# Patient Record
Sex: FEMALE | Race: WHITE | HISPANIC OR LATINO | Employment: UNEMPLOYED | URBAN - METROPOLITAN AREA
[De-identification: names, ages, dates, MRNs, and addresses within clinical notes are randomized per-mention and may not be internally consistent; named-entity substitution may affect disease eponyms.]

---

## 2024-01-26 ENCOUNTER — HOSPITAL ENCOUNTER (EMERGENCY)
Facility: HOSPITAL | Age: 49
Discharge: HOME | End: 2024-01-26
Attending: FAMILY MEDICINE

## 2024-01-26 VITALS
HEIGHT: 63 IN | HEART RATE: 76 BPM | OXYGEN SATURATION: 98 % | RESPIRATION RATE: 20 BRPM | BODY MASS INDEX: 30.48 KG/M2 | WEIGHT: 172 LBS | DIASTOLIC BLOOD PRESSURE: 73 MMHG | SYSTOLIC BLOOD PRESSURE: 128 MMHG | TEMPERATURE: 98 F

## 2024-01-26 DIAGNOSIS — N30.00 ACUTE CYSTITIS WITHOUT HEMATURIA: Primary | ICD-10-CM

## 2024-01-26 DIAGNOSIS — N12 PYELONEPHRITIS: ICD-10-CM

## 2024-01-26 LAB
ALBUMIN SERPL BCP-MCNC: 4.2 G/DL (ref 3.5–5.2)
ALP SERPL-CCNC: 98 U/L (ref 38–126)
ALT SERPL W/O P-5'-P-CCNC: 22 U/L (ref 10–44)
ANION GAP SERPL CALC-SCNC: 14 MMOL/L (ref 8–16)
AST SERPL-CCNC: 24 U/L (ref 15–46)
B-HCG UR QL: NEGATIVE
BACTERIA #/AREA URNS AUTO: ABNORMAL /HPF
BASOPHILS # BLD AUTO: 0.02 K/UL (ref 0–0.2)
BASOPHILS NFR BLD: 0.2 % (ref 0–1.9)
BILIRUB SERPL-MCNC: 0.4 MG/DL (ref 0.1–1)
BILIRUB UR QL STRIP: NEGATIVE
CALCIUM SERPL-MCNC: 9.4 MG/DL (ref 8.7–10.5)
CHLORIDE SERPL-SCNC: 101 MMOL/L (ref 95–110)
CLARITY UR REFRACT.AUTO: CLEAR
CO2 SERPL-SCNC: 21 MMOL/L (ref 23–29)
COLOR UR AUTO: YELLOW
CREAT SERPL-MCNC: 0.29 MG/DL (ref 0.5–1.4)
CTP QC/QA: YES
DIFFERENTIAL METHOD BLD: NORMAL
EOSINOPHIL # BLD AUTO: 0.2 K/UL (ref 0–0.5)
EOSINOPHIL NFR BLD: 1.9 % (ref 0–8)
ERYTHROCYTE [DISTWIDTH] IN BLOOD BY AUTOMATED COUNT: 11.7 % (ref 11.5–14.5)
EST. GFR  (NO RACE VARIABLE): >60 ML/MIN/1.73 M^2
GLUCOSE SERPL-MCNC: 264 MG/DL (ref 70–110)
GLUCOSE UR QL STRIP: ABNORMAL
HCT VFR BLD AUTO: 44.5 % (ref 37–48.5)
HGB BLD-MCNC: 14.8 G/DL (ref 12–16)
HGB UR QL STRIP: ABNORMAL
IMM GRANULOCYTES # BLD AUTO: 0.03 K/UL (ref 0–0.04)
IMM GRANULOCYTES NFR BLD AUTO: 0.3 % (ref 0–0.5)
KETONES UR QL STRIP: NEGATIVE
LEUKOCYTE ESTERASE UR QL STRIP: ABNORMAL
LIPASE SERPL-CCNC: 86 U/L (ref 23–300)
LYMPHOCYTES # BLD AUTO: 2.2 K/UL (ref 1–4.8)
LYMPHOCYTES NFR BLD: 25.6 % (ref 18–48)
MCH RBC QN AUTO: 28.8 PG (ref 27–31)
MCHC RBC AUTO-ENTMCNC: 33.3 G/DL (ref 32–36)
MCV RBC AUTO: 87 FL (ref 82–98)
MICROSCOPIC COMMENT: ABNORMAL
MONOCYTES # BLD AUTO: 0.4 K/UL (ref 0.3–1)
MONOCYTES NFR BLD: 4.2 % (ref 4–15)
NEUTROPHILS # BLD AUTO: 5.9 K/UL (ref 1.8–7.7)
NEUTROPHILS NFR BLD: 67.8 % (ref 38–73)
NITRITE UR QL STRIP: NEGATIVE
NRBC BLD-RTO: 0 /100 WBC
PH UR STRIP: 6 [PH] (ref 5–8)
PLATELET # BLD AUTO: 256 K/UL (ref 150–450)
PMV BLD AUTO: 10.1 FL (ref 9.2–12.9)
POTASSIUM SERPL-SCNC: 3.6 MMOL/L (ref 3.5–5.1)
PROT SERPL-MCNC: 7.8 G/DL (ref 6–8.4)
PROT UR QL STRIP: NEGATIVE
RBC # BLD AUTO: 5.13 M/UL (ref 4–5.4)
RBC #/AREA URNS AUTO: 3 /HPF (ref 0–4)
SODIUM SERPL-SCNC: 136 MMOL/L (ref 136–145)
SP GR UR STRIP: 1.01 (ref 1–1.03)
URN SPEC COLLECT METH UR: ABNORMAL
UROBILINOGEN UR STRIP-ACNC: NEGATIVE EU/DL
UUN UR-MCNC: 8 MG/DL (ref 7–17)
WBC # BLD AUTO: 8.76 K/UL (ref 3.9–12.7)
WBC #/AREA URNS AUTO: 60 /HPF (ref 0–5)
WBC CLUMPS UR QL AUTO: ABNORMAL
YEAST UR QL AUTO: ABNORMAL

## 2024-01-26 PROCEDURE — 87186 SC STD MICRODIL/AGAR DIL: CPT | Mod: ER | Performed by: PHYSICIAN ASSISTANT

## 2024-01-26 PROCEDURE — 85025 COMPLETE CBC W/AUTO DIFF WBC: CPT | Mod: ER | Performed by: PHYSICIAN ASSISTANT

## 2024-01-26 PROCEDURE — 96375 TX/PRO/DX INJ NEW DRUG ADDON: CPT | Mod: ER

## 2024-01-26 PROCEDURE — 96365 THER/PROPH/DIAG IV INF INIT: CPT | Mod: ER

## 2024-01-26 PROCEDURE — 87147 CULTURE TYPE IMMUNOLOGIC: CPT | Mod: ER | Performed by: PHYSICIAN ASSISTANT

## 2024-01-26 PROCEDURE — 83690 ASSAY OF LIPASE: CPT | Mod: ER | Performed by: PHYSICIAN ASSISTANT

## 2024-01-26 PROCEDURE — 63600175 PHARM REV CODE 636 W HCPCS: Mod: ER | Performed by: PHYSICIAN ASSISTANT

## 2024-01-26 PROCEDURE — 87088 URINE BACTERIA CULTURE: CPT | Mod: ER | Performed by: PHYSICIAN ASSISTANT

## 2024-01-26 PROCEDURE — 81000 URINALYSIS NONAUTO W/SCOPE: CPT | Mod: ER | Performed by: PHYSICIAN ASSISTANT

## 2024-01-26 PROCEDURE — 96361 HYDRATE IV INFUSION ADD-ON: CPT | Mod: ER

## 2024-01-26 PROCEDURE — 87086 URINE CULTURE/COLONY COUNT: CPT | Mod: ER | Performed by: PHYSICIAN ASSISTANT

## 2024-01-26 PROCEDURE — 99285 EMERGENCY DEPT VISIT HI MDM: CPT | Mod: 25,ER

## 2024-01-26 PROCEDURE — 25000003 PHARM REV CODE 250: Mod: ER | Performed by: PHYSICIAN ASSISTANT

## 2024-01-26 PROCEDURE — 80053 COMPREHEN METABOLIC PANEL: CPT | Mod: ER | Performed by: PHYSICIAN ASSISTANT

## 2024-01-26 PROCEDURE — 81025 URINE PREGNANCY TEST: CPT | Mod: ER | Performed by: PHYSICIAN ASSISTANT

## 2024-01-26 PROCEDURE — 87077 CULTURE AEROBIC IDENTIFY: CPT | Mod: ER | Performed by: PHYSICIAN ASSISTANT

## 2024-01-26 RX ORDER — ONDANSETRON HYDROCHLORIDE 2 MG/ML
4 INJECTION, SOLUTION INTRAVENOUS
Status: COMPLETED | OUTPATIENT
Start: 2024-01-26 | End: 2024-01-26

## 2024-01-26 RX ORDER — ONDANSETRON 4 MG/1
4 TABLET, ORALLY DISINTEGRATING ORAL EVERY 6 HOURS PRN
Qty: 20 TABLET | Refills: 0 | Status: SHIPPED | OUTPATIENT
Start: 2024-01-26

## 2024-01-26 RX ORDER — CEPHALEXIN 500 MG/1
500 CAPSULE ORAL EVERY 8 HOURS
Qty: 21 CAPSULE | Refills: 0 | Status: SHIPPED | OUTPATIENT
Start: 2024-01-26 | End: 2024-02-02

## 2024-01-26 RX ORDER — KETOROLAC TROMETHAMINE 30 MG/ML
15 INJECTION, SOLUTION INTRAMUSCULAR; INTRAVENOUS
Status: COMPLETED | OUTPATIENT
Start: 2024-01-26 | End: 2024-01-26

## 2024-01-26 RX ADMIN — CEFTRIAXONE SODIUM 1 G: 1 INJECTION, POWDER, FOR SOLUTION INTRAMUSCULAR; INTRAVENOUS at 11:01

## 2024-01-26 RX ADMIN — KETOROLAC TROMETHAMINE 15 MG: 30 INJECTION, SOLUTION INTRAMUSCULAR; INTRAVENOUS at 11:01

## 2024-01-26 RX ADMIN — SODIUM CHLORIDE 1000 ML: 9 INJECTION, SOLUTION INTRAVENOUS at 11:01

## 2024-01-26 RX ADMIN — ONDANSETRON 4 MG: 2 INJECTION INTRAMUSCULAR; INTRAVENOUS at 11:01

## 2024-01-26 NOTE — DISCHARGE INSTRUCTIONS

## 2024-01-26 NOTE — ED PROVIDER NOTES
Encounter Date: 1/26/2024       History     Chief Complaint   Patient presents with    Abdominal Pain     Lower abdominal pain, back pain, painful urination, nausea, no vomiting, started over 2 days ago      48-year-old female, PMH dm, presents to ED with concern of 2 day onset dysuria, increase in urine frequency, hematuria, flank pain, back pain, nausea.  Denying fevers, chills, vomiting, bowel complaints, chest pain, cough, shortness of breath.  She does admit she was seen at outside facility 2 days ago but eloped due to long wait.  She has not taken any medications for her symptoms.  Denies history of kidney stones or kidney complications.  No other acute complaints at this time.    The history is provided by the patient. The history is limited by a language barrier. A  was used ([facility]:  [id number]).     Review of patient's allergies indicates:  No Known Allergies  Past Medical History:   Diagnosis Date    Diabetes mellitus      History reviewed. No pertinent surgical history.  History reviewed. No pertinent family history.  Social History     Tobacco Use    Smoking status: Never   Substance Use Topics    Alcohol use: No    Drug use: No     Review of Systems   Constitutional:  Negative for chills and fever.   HENT:  Negative for congestion and sore throat.    Respiratory:  Negative for cough and shortness of breath.    Cardiovascular:  Negative for chest pain.   Gastrointestinal:  Positive for abdominal pain and nausea. Negative for constipation, diarrhea and vomiting.   Genitourinary:  Positive for dysuria, flank pain and hematuria.   Musculoskeletal:  Positive for back pain. Negative for neck pain and neck stiffness.   Neurological:  Negative for headaches.       Physical Exam     Initial Vitals [01/26/24 1014]   BP Pulse Resp Temp SpO2   139/81 88 18 98.3 °F (36.8 °C) 98 %      MAP       --         Physical Exam    Nursing note and vitals reviewed.  Constitutional: Vital signs are normal. She  appears well-developed and well-nourished. She is cooperative. She does not have a sickly appearance. She does not appear ill. No distress.   HENT:   Head: Normocephalic and atraumatic.   Eyes: EOM are normal.   Neck: Neck supple.   Normal range of motion.  Cardiovascular:  Normal rate, regular rhythm and normal heart sounds.           Pulmonary/Chest: Effort normal and breath sounds normal.   Abdominal: Abdomen is soft.   Suprapubic abdominal tenderness radiating towards right flank region.  Positive CVA tenderness.  No guarding.  No skin changes   Musculoskeletal:         General: No tenderness.      Cervical back: Normal range of motion and neck supple.     Neurological: She is alert and oriented to person, place, and time. She is not disoriented. GCS eye subscore is 4. GCS verbal subscore is 5. GCS motor subscore is 6.   Skin: Skin is warm and dry.   Psychiatric: She has a normal mood and affect. Her speech is normal and behavior is normal. Thought content normal.         ED Course   Procedures  Labs Reviewed   URINALYSIS, REFLEX TO URINE CULTURE - Abnormal; Notable for the following components:       Result Value    Glucose, UA 3+ (*)     Occult Blood UA 1+ (*)     Leukocytes, UA Trace (*)     All other components within normal limits    Narrative:     Preferred Collection Type->Urine, Clean Catch  Specimen Source->Urine   COMPREHENSIVE METABOLIC PANEL - Abnormal; Notable for the following components:    CO2 21 (*)     Glucose 264 (*)     Creatinine 0.29 (*)     All other components within normal limits   URINALYSIS MICROSCOPIC - Abnormal; Notable for the following components:    WBC, UA 60 (*)     WBC Clumps, UA Few (*)     All other components within normal limits    Narrative:     Preferred Collection Type->Urine, Clean Catch  Specimen Source->Urine   CULTURE, URINE   CBC W/ AUTO DIFFERENTIAL   LIPASE   POCT URINE PREGNANCY          Imaging Results              CT Renal Stone Study ABD Pelvis WO (Final  result)  Result time 01/26/24 12:13:18      Final result by Iain Wooten MD (01/26/24 12:13:18)                   Impression:      1. Mild diffuse bladder wall thickening, correlate for cystitis.  2. No renal calculus or hydronephrosis.      Electronically signed by: Iain Wooten  Date:    01/26/2024  Time:    12:13               Narrative:    EXAMINATION:  CT RENAL STONE STUDY ABD PELVIS WO    CLINICAL HISTORY:  Flank pain, kidney stone suspected;    COMPARISON:  None available.    TECHNIQUE:  Axial CT images were obtained of the abdomen and pelvis.  Iterative reconstruction technique was used. The CT exam was performed using one or more of the following dose reduction techniques- Automated exposure control, adjustment of the mA and/or kV according to patient size, and/or use of iterative reconstructed technique.    FINDINGS:  Heart: Normal in size. No pericardial effusion.    Lung Bases: Mild bibasilar atelectasis/scarring.    Liver: Unremarkable.    Gallbladder: No calcified gallstones.    Bile Ducts: No evidence of dilated ducts.    Pancreas: No mass or peripancreatic fat stranding.    Spleen: Unremarkable.    Adrenals: Unremarkable.    Kidneys/ Ureters: Unremarkable.  No renal calculus or hydronephrosis.    Bladder: Mild diffuse bladder wall thickening.    Reproductive organs: Unremarkable.    GI Tract/Mesentery: No evidence of bowel obstruction or acute inflammation.  None no evidence of acute appendicitis.    Peritoneal Space: Unremarkable.    Retroperitoneum: No adenopathy.    Abdominal wall: Unremarkable.    Vasculature: No aortic aneurysm.    Bones: No acute fracture.                                       Medications   sodium chloride 0.9% bolus 1,000 mL 1,000 mL (0 mLs Intravenous Stopped 1/26/24 1249)   ondansetron injection 4 mg (4 mg Intravenous Given 1/26/24 1121)   ketorolac injection 15 mg (15 mg Intravenous Given 1/26/24 1121)   cefTRIAXone (Rocephin) 1 g in dextrose 5 % in water (D5W) 100 mL  IVPB (MB+) (0 g Intravenous Stopped 1/26/24 1227)     Medical Decision Making  Patient presents with concern of 2 day onset flank pain, nausea, hematuria, dysuria, nausea.  Afebrile arrival with vitals WNL.  Patient in no distress but noted have suprapubic tenderness with tenderness radiating towards right flank.  Positive CVA tenderness.    DDx:  Including but not limited to dysuria, hematuria, UTI, pyelonephritis, nephrolithiasis, urolithiasis    Amount and/or Complexity of Data Reviewed  Labs: ordered. Decision-making details documented in ED Course.  Radiology: ordered.    Risk  Prescription drug management.               ED Course as of 01/26/24 1325   Fri Jan 26, 2024   1105 Temp: 98.3 °F (36.8 °C) [KS]   1105 Pulse: 88 [KS]   1105 Resp: 18 [KS]   1106 POCT urine pregnancy  Negative [KS]   1120 Urinalysis, Reflex to Urine Culture Urine, Clean Catch(!)  Concerning for urinary infection noting 60 WBC and few WBC clumps.  Urine sent to culture.  Will give Rocephin in ED. [KS]   1136 CBC auto differential  WNL.  No elevation in WBC. [KS]   1149 Comprehensive metabolic panel(!)  Hyperglycemia 264.  No significant electrolyte abnormality.  No evidence of DKA.   [KS]   1149 Lipase  WNL [KS]   1324 CT Renal Stone Study ABD Pelvis WO  CT renal per Radiology review:  1. Mild diffuse bladder wall thickening, correlate for cystitis.  2. No renal calculus or hydronephrosis. [KS]   1324 Patient reporting significant improvements of symptoms with ED intervention.  Stable for discharge.  Prescription for Keflex and Zofran.  Encouraged hydration, rest, PCP follow-up.  ED return precautions were discussed.  Patient states her understanding and agrees with plan. [KS]      ED Course User Index  [KS] Sushil Minaya PA-C                           Clinical Impression:  Final diagnoses:  [N30.00] Acute cystitis without hematuria (Primary)  [N12] Pyelonephritis          ED Disposition Condition    Discharge Stable          ED  Prescriptions       Medication Sig Dispense Start Date End Date Auth. Provider    cephALEXin (KEFLEX) 500 MG capsule Take 1 capsule (500 mg total) by mouth every 8 (eight) hours. for 7 days 21 capsule 2024 Sushil Minaya PA-C    ondansetron (ZOFRAN-ODT) 4 MG TbDL Take 1 tablet (4 mg total) by mouth every 6 (six) hours as needed (nausea). 20 tablet 2024 -- Sushil Minaya PA-C          Follow-up Information       Follow up With Specialties Details Why Contact Info    Cody Neely MD Family Medicine   LICENSE   [place] [id number]  [phone]               Sushil Minaya PA-C  [id number]

## 2024-01-29 LAB
BACTERIA UR CULT: ABNORMAL
BACTERIA UR CULT: ABNORMAL